# Patient Record
Sex: FEMALE | Race: WHITE | NOT HISPANIC OR LATINO | Employment: FULL TIME | URBAN - METROPOLITAN AREA
[De-identification: names, ages, dates, MRNs, and addresses within clinical notes are randomized per-mention and may not be internally consistent; named-entity substitution may affect disease eponyms.]

---

## 2022-05-31 PROCEDURE — 99283 EMERGENCY DEPT VISIT LOW MDM: CPT

## 2022-06-01 ENCOUNTER — HOSPITAL ENCOUNTER (EMERGENCY)
Facility: HOSPITAL | Age: 22
Discharge: HOME | End: 2022-06-01
Attending: EMERGENCY MEDICINE
Payer: COMMERCIAL

## 2022-06-01 VITALS
WEIGHT: 180 LBS | BODY MASS INDEX: 30.73 KG/M2 | HEIGHT: 64 IN | TEMPERATURE: 98 F | SYSTOLIC BLOOD PRESSURE: 134 MMHG | DIASTOLIC BLOOD PRESSURE: 89 MMHG | OXYGEN SATURATION: 99 % | RESPIRATION RATE: 16 BRPM | HEART RATE: 104 BPM

## 2022-06-01 DIAGNOSIS — S89.92XA LEFT KNEE INJURY, INITIAL ENCOUNTER: ICD-10-CM

## 2022-06-01 DIAGNOSIS — S80.02XA CONTUSION OF LEFT KNEE, INITIAL ENCOUNTER: Primary | ICD-10-CM

## 2022-06-01 PROCEDURE — 25000003 PHARM REV CODE 250: Performed by: EMERGENCY MEDICINE

## 2022-06-01 RX ORDER — IBUPROFEN 600 MG/1
600 TABLET ORAL
Status: COMPLETED | OUTPATIENT
Start: 2022-06-01 | End: 2022-06-01

## 2022-06-01 RX ORDER — ACETAMINOPHEN 325 MG/1
650 TABLET ORAL
Status: COMPLETED | OUTPATIENT
Start: 2022-06-01 | End: 2022-06-01

## 2022-06-01 RX ADMIN — IBUPROFEN 600 MG: 600 TABLET ORAL at 12:06

## 2022-06-01 RX ADMIN — ACETAMINOPHEN 650 MG: 325 TABLET ORAL at 12:06

## 2022-06-01 NOTE — ED PROVIDER NOTES
Encounter Date: 5/31/2022       History     Chief Complaint   Patient presents with    Knee Injury     Pt hit her left knee into the side of the house.      22-year-old female here with complaints of left knee pain after she struck her knee on the door frame while entering her home.  She had the dog on a leash, and she believes that the dog caused her lose her balance.  She complains of pain in the lateral aspect of the left patella.  Denies any previous history of knee pain or injury.  Incident happened approximately 1 hour prior to arrival.  Patient has not tried any over-the-counter medications for her symptoms.  Denies any other injuries.        Review of patient's allergies indicates:  No Known Allergies  History reviewed. No pertinent past medical history.  History reviewed. No pertinent surgical history.  History reviewed. No pertinent family history.  Social History     Tobacco Use    Smoking status: Current Every Day Smoker     Types: Vaping with nicotine    Smokeless tobacco: Never Used   Substance Use Topics    Alcohol use: Yes     Comment: occ    Drug use: Never     Review of Systems   Constitutional: Negative.    HENT: Negative.    Eyes: Negative.    Respiratory: Negative.    Cardiovascular: Negative.    Gastrointestinal: Negative.    Endocrine: Negative.    Genitourinary: Negative.    Musculoskeletal: Positive for arthralgias. Negative for back pain and neck pain.   Skin: Negative.    Allergic/Immunologic: Negative.    Neurological: Negative.    Psychiatric/Behavioral: Negative.        Physical Exam     Initial Vitals [06/01/22 0004]   BP Pulse Resp Temp SpO2   134/89 104 16 97.8 °F (36.6 °C) 99 %      MAP       --         Physical Exam    Nursing note and vitals reviewed.  Constitutional: She appears well-developed and well-nourished. She is not diaphoretic.   HENT:   Head: Normocephalic and atraumatic.   Nose: Nose normal.   Mouth/Throat: Oropharynx is clear and moist. No oropharyngeal exudate.    Eyes: Conjunctivae and EOM are normal. Pupils are equal, round, and reactive to light. No scleral icterus.   Neck: Neck supple. No JVD present.   Normal range of motion.  Cardiovascular: Normal rate, regular rhythm, normal heart sounds and intact distal pulses.   No murmur heard.  Pulmonary/Chest: Breath sounds normal. No stridor. No respiratory distress. She has no wheezes. She has no rhonchi. She has no rales.   Abdominal: Abdomen is soft. Bowel sounds are normal. She exhibits no distension. There is no abdominal tenderness.   Musculoskeletal:         General: Tenderness present. No edema. Normal range of motion.      Cervical back: Normal range of motion and neck supple.      Comments: There is a slight area of ecchymosis on the lateral aspect of the left patella.  Otherwise, there is no visual evidence of injury.  She has mild tenderness to palpation surrounding the area of ecchymosis, otherwise no tenderness in the knee.  Range of motion increases her discomfort, but there is no crepitus or joint laxity.      Neurological: She is alert and oriented to person, place, and time. She has normal strength and normal reflexes. No cranial nerve deficit or sensory deficit. GCS score is 15. GCS eye subscore is 4. GCS verbal subscore is 5. GCS motor subscore is 6.   Skin: Skin is warm and dry. Capillary refill takes less than 2 seconds. No rash noted. No erythema.   Psychiatric: She has a normal mood and affect. Her behavior is normal.         ED Course   Procedures  Labs Reviewed - No data to display       Imaging Results          X-Ray Knee 3 View Left (In process)               X-Rays:   Independently Interpreted Readings:   Other Readings:  X-ray left knee personally reviewed by me shows no evidence of fracture or dislocation.  Official radiology read pending.    Medications   acetaminophen tablet 650 mg (650 mg Oral Given 6/1/22 0045)   ibuprofen tablet 600 mg (600 mg Oral Given 6/1/22 0045)     Medical Decision  Making:   Differential Diagnosis:   Fracture, sprain, strain, contusion, dislocation, etc.  ED Management:  X-rays reviewed by me show no evidence of fracture.  Official radiology read pending.  Patient likely suffering from a simple contusion.  Will discharge home with instructions for Tylenol, Motrin, and ice packs as needed.  Follow-up with PCP in 1 or 2 days and return here as needed or if worse in any way.                      Clinical Impression:   Final diagnoses:  [S89.92XA] Left knee injury, initial encounter  [S80.02XA] Contusion of left knee, initial encounter (Primary)          ED Disposition Condition    Discharge Stable        ED Prescriptions     None        Follow-up Information     Follow up With Specialties Details Why Contact Info    Your primary care doctor  Call in 2 days      [facility] Emergency Medicine  As needed, If symptoms worsen [address] [facility] [id number] [phone]           Kenan Vanegas MD  06/01/22 0140

## 2022-06-01 NOTE — DISCHARGE INSTRUCTIONS
As we discussed, your x-rays not show any obvious fractures.  A radiologist will read the x-rays in the morning and if any new findings are discovered, you will receive notice via telephone.  Your likely suffering from a bruise of the knee.  Take over-the-counter Tylenol and Motrin, and use ice packs as we discussed to help reduce any pain and swelling.  Follow-up with your primary care provider in few days and return here as needed or if worse in any way.